# Patient Record
Sex: MALE | Race: WHITE | NOT HISPANIC OR LATINO | Employment: FULL TIME | URBAN - METROPOLITAN AREA
[De-identification: names, ages, dates, MRNs, and addresses within clinical notes are randomized per-mention and may not be internally consistent; named-entity substitution may affect disease eponyms.]

---

## 2023-03-07 ENCOUNTER — OFFICE VISIT (OUTPATIENT)
Dept: PRIMARY CARE | Facility: CLINIC | Age: 28
End: 2023-03-07
Payer: COMMERCIAL

## 2023-03-07 VITALS
DIASTOLIC BLOOD PRESSURE: 79 MMHG | WEIGHT: 223.5 LBS | OXYGEN SATURATION: 96 % | BODY MASS INDEX: 35.08 KG/M2 | HEIGHT: 67 IN | HEART RATE: 73 BPM | SYSTOLIC BLOOD PRESSURE: 126 MMHG

## 2023-03-07 DIAGNOSIS — R00.2 PALPITATIONS: ICD-10-CM

## 2023-03-07 DIAGNOSIS — R94.31 ABNORMAL EKG: ICD-10-CM

## 2023-03-07 DIAGNOSIS — F41.9 ANXIETY: ICD-10-CM

## 2023-03-07 DIAGNOSIS — R06.09 DOE (DYSPNEA ON EXERTION): Primary | ICD-10-CM

## 2023-03-07 PROCEDURE — 99203 OFFICE O/P NEW LOW 30 MIN: CPT | Performed by: FAMILY MEDICINE

## 2023-03-07 RX ORDER — ALBUTEROL SULFATE 90 UG/1
2 AEROSOL, METERED RESPIRATORY (INHALATION) EVERY 4 HOURS PRN
Qty: 8.5 G | Refills: 0 | Status: SHIPPED | OUTPATIENT
Start: 2023-03-07 | End: 2024-03-06

## 2023-03-07 ASSESSMENT — ENCOUNTER SYMPTOMS
DIZZINESS: 1
PALPITATIONS: 1
WHEEZING: 1
COUGH: 1
NAUSEA: 0
BACK PAIN: 1
CONSTIPATION: 0
WEAKNESS: 0
CHEST TIGHTNESS: 0
NUMBNESS: 0
VOMITING: 0
ABDOMINAL PAIN: 0
FEVER: 0
UNEXPECTED WEIGHT CHANGE: 0
SHORTNESS OF BREATH: 1
DIARRHEA: 0
CHILLS: 0
BLOOD IN STOOL: 0

## 2023-03-07 NOTE — PROGRESS NOTES
Subjective   Patient ID: Beau Amin is a 28 y.o. male who presents for Shortness of Breath (Pt presents as new pt. States he has a lot of stress, SOB post covid x 2 and the flu, low back pain comes and goes.BL).  C/o SOB during COVID the first time (2020), fine during COVID the second time (Summer 2020), real bad when he had influenza (Nov, Dec 2022, diagnosed at urgent care in [place], not sure if A or B), and since then. SOB worst going up stairs or up hill. Smokes, wants to quit, only time he ever quit is when he was locked up and when ill.   Also c/o wheezing, mild orthopnea, cough since influenza.  Denies h/o asthma. Sister and mother have asthma. Denies FMHx heart disease.    Denies chest pain, exertional chest pain, denies pain/swelling/bruising/redness in the legs, pain with deep inhalation.    Shortness of Breath  Associated symptoms include wheezing. Pertinent negatives include no abdominal pain, chest pain, fever, leg swelling, rash or vomiting.       Review of Systems   Constitutional:  Negative for chills, fever and unexpected weight change.   HENT:  Negative for congestion.    Respiratory:  Positive for cough, shortness of breath and wheezing. Negative for chest tightness.    Cardiovascular:  Positive for palpitations. Negative for chest pain and leg swelling.   Gastrointestinal:  Negative for abdominal pain, blood in stool, constipation, diarrhea, nausea and vomiting.   Genitourinary: Negative.    Musculoskeletal:  Positive for back pain.   Skin:  Negative for rash.   Neurological:  Positive for dizziness (with anxiety attacks). Negative for weakness and numbness.       Objective   Physical Exam  Constitutional:       General: He is not in acute distress.     Appearance: Normal appearance. He is obese.   HENT:      Head: Normocephalic and atraumatic.   Eyes:      General: No scleral icterus.     Extraocular Movements: Extraocular movements intact.      Conjunctiva/sclera: Conjunctivae normal.    Neck:      Vascular: No carotid bruit.      Comments: Normal thyroid  Cardiovascular:      Rate and Rhythm: Normal rate and regular rhythm.      Pulses: Normal pulses.      Heart sounds: Normal heart sounds. No murmur heard.     No friction rub. No gallop.   Pulmonary:      Effort: Pulmonary effort is normal. No respiratory distress.      Breath sounds: Normal breath sounds. No wheezing, rhonchi or rales.   Abdominal:      General: Abdomen is flat. There is no distension.      Palpations: Abdomen is soft. There is no mass.      Tenderness: There is no abdominal tenderness. There is no guarding or rebound.   Musculoskeletal:         General: Normal range of motion.      Cervical back: Normal range of motion and neck supple. No rigidity or tenderness.   Lymphadenopathy:      Cervical: No cervical adenopathy.   Skin:     General: Skin is warm and dry.   Neurological:      General: No focal deficit present.      Mental Status: He is alert and oriented to person, place, and time. Mental status is at baseline.      Motor: No weakness.   Psychiatric:         Mood and Affect: Mood normal.         Behavior: Behavior normal.         Thought Content: Thought content normal.         Judgment: Judgment normal.      Comments: Moderately anxious when discussing blood draw.         Assessment/Plan   Diagnoses and all orders for this visit:  ROCA (dyspnea on exertion)  Comments:  Cardiac workup. Try albuterol inhaler.  Anxiety  Comments:  May consider SSRI or BuSpar. He declines medications, for now (does not like to take medications).  Palpitations  Comments:  Holter monitor.

## 2023-03-07 NOTE — PATIENT INSTRUCTIONS
Recommend eliminating caffeine and alcohol.    Recommend smoking cessation. You might try cold turkey or nicotine replacement, e.g., nicotine lozenges, gum, or patch.    Please schedule a follow-up appointment after testing, to review results and options, and an additional problem-focused appointment(s) for additional problem(s). Recommend a wellness visit within 1-2 months.    Please return or seek medical attention if symptoms persist, change, worsen, or return. For emergencies, call 9-1-1 or go to the nearest Emergency Room.

## 2023-04-17 ENCOUNTER — OFFICE VISIT (OUTPATIENT)
Dept: PRIMARY CARE | Facility: CLINIC | Age: 28
End: 2023-04-17
Payer: COMMERCIAL

## 2023-04-17 VITALS
SYSTOLIC BLOOD PRESSURE: 136 MMHG | WEIGHT: 229.5 LBS | OXYGEN SATURATION: 97 % | DIASTOLIC BLOOD PRESSURE: 97 MMHG | HEART RATE: 85 BPM | BODY MASS INDEX: 35.94 KG/M2

## 2023-04-17 DIAGNOSIS — R94.31 ABNORMAL EKG: ICD-10-CM

## 2023-04-17 DIAGNOSIS — R06.09 DOE (DYSPNEA ON EXERTION): ICD-10-CM

## 2023-04-17 DIAGNOSIS — F41.9 ANXIETY: Primary | ICD-10-CM

## 2023-04-17 PROBLEM — R07.9 CHEST PAIN: Status: RESOLVED | Noted: 2023-04-17 | Resolved: 2023-04-17

## 2023-04-17 PROCEDURE — 99214 OFFICE O/P EST MOD 30 MIN: CPT | Performed by: FAMILY MEDICINE

## 2023-04-17 RX ORDER — ESCITALOPRAM OXALATE 10 MG/1
10 TABLET ORAL DAILY
Qty: 30 TABLET | Refills: 2 | Status: SHIPPED | OUTPATIENT
Start: 2023-04-17 | End: 2023-07-17

## 2023-04-17 RX ORDER — HYDROXYZINE PAMOATE 25 MG/1
25 CAPSULE ORAL EVERY 6 HOURS PRN
Qty: 40 CAPSULE | Refills: 0 | Status: SHIPPED | OUTPATIENT
Start: 2023-04-17

## 2023-04-17 RX ORDER — AMOXICILLIN 500 MG/1
500 CAPSULE ORAL EVERY 8 HOURS SCHEDULED
COMMUNITY
Start: 2023-04-03

## 2023-04-17 RX ORDER — HYDROXYZINE PAMOATE 25 MG/1
1 CAPSULE ORAL EVERY 6 HOURS
COMMUNITY
Start: 2023-04-12 | End: 2023-04-17 | Stop reason: SDUPTHER

## 2023-04-17 ASSESSMENT — ENCOUNTER SYMPTOMS
DIARRHEA: 0
UNEXPECTED WEIGHT CHANGE: 0
DIFFICULTY URINATING: 0
SHORTNESS OF BREATH: 0
FEVER: 0
CHILLS: 0
COUGH: 0
DYSURIA: 0
CONFUSION: 0
NAUSEA: 0
ABDOMINAL PAIN: 0
WHEEZING: 0
DIZZINESS: 0
NUMBNESS: 0
TROUBLE SWALLOWING: 0
LIGHT-HEADEDNESS: 0
BLOOD IN STOOL: 0
VOMITING: 0
WEAKNESS: 0

## 2023-04-17 NOTE — PATIENT INSTRUCTIONS
As always, you should review all possible adverse effects of any medication, prior to taking it.     Please return for a follow-up appointment in 3 months, earlier if any question or concern.

## 2023-04-17 NOTE — PROGRESS NOTES
"Subjective   Patient ID: Beau Amin is a 28 y.o. male who presents for ER Follow-up (Pt presents in ER follow up, dx anxiety/stress, meds given, discuss meds.BL).  HPI  Ended up going to ER prior to getting tests done. Reports diagnosed with anxiety. C/o lots of stress at work and in life.     C/o \"crazy dreams\" with the Hydroxyzine. Report significant relief with taking Vistaril 4x daily. Tried Zoloft when he was a teen, made him feel \"off,\" withdrawn. Zoloft works well for family member(s). Cut back on alcohol, but on a day he drinks, has ~8, about once a week. Is interested in trying Lexapro.    Review of Systems   Constitutional:  Negative for chills, fever and unexpected weight change.   HENT:  Negative for ear pain and trouble swallowing.    Respiratory:  Negative for cough, shortness of breath and wheezing.    Cardiovascular:  Negative for chest pain.   Gastrointestinal:  Negative for abdominal pain, blood in stool, diarrhea, nausea and vomiting.   Genitourinary:  Negative for difficulty urinating and dysuria.   Skin:  Negative for rash.   Neurological:  Negative for dizziness, syncope, weakness, light-headedness and numbness.   Psychiatric/Behavioral:  Negative for behavioral problems and confusion.          Objective   BP (!) 136/97   Pulse 85   Wt 104 kg (229 lb 8 oz)   SpO2 97%   BMI 35.94 kg/m²     Physical Exam  Vitals and nursing note reviewed.   Constitutional:       General: He is not in acute distress.     Appearance: Normal appearance.   HENT:      Head: Normocephalic and atraumatic.   Eyes:      General: No scleral icterus.     Extraocular Movements: Extraocular movements intact.      Conjunctiva/sclera: Conjunctivae normal.   Pulmonary:      Effort: Pulmonary effort is normal. No respiratory distress.   Skin:     General: Skin is warm and dry.      Coloration: Skin is not jaundiced.   Neurological:      Mental Status: He is alert and oriented to person, place, and time. Mental status is " at baseline.   Psychiatric:         Mood and Affect: Mood normal.         Thought Content: Thought content normal.         Assessment/Plan   Problem List Items Addressed This Visit          Other    Anxiety - Primary     Try Lexapro. May take Vistaril PRN. Return in 2-3 months.         Relevant Medications    escitalopram (Lexapro) 10 mg tablet    hydrOXYzine pamoate (Vistaril) 25 mg capsule     Other Visit Diagnoses       ROCA (dyspnea on exertion)        Appears to be due to anxiety.    Relevant Orders    Stress test    Abnormal EKG        Declines nuclear stress test. Requests treadmill stress test, instead. Cautioned the nuclear test gives more info/other might miss, adamantly declines.    Relevant Orders    Stress test

## 2023-07-14 DIAGNOSIS — F41.9 ANXIETY: ICD-10-CM

## 2023-07-17 RX ORDER — ESCITALOPRAM OXALATE 10 MG/1
TABLET ORAL
Qty: 30 TABLET | Refills: 0 | Status: SHIPPED | OUTPATIENT
Start: 2023-07-17 | End: 2023-08-15

## 2023-08-15 DIAGNOSIS — F41.9 ANXIETY: ICD-10-CM

## 2023-08-15 RX ORDER — ESCITALOPRAM OXALATE 10 MG/1
TABLET ORAL
Qty: 30 TABLET | Refills: 0 | Status: SHIPPED | OUTPATIENT
Start: 2023-08-15 | End: 2023-09-19

## 2023-08-15 RX ORDER — CYCLOBENZAPRINE HCL 10 MG
TABLET ORAL
COMMUNITY
Start: 2017-06-22

## 2023-08-15 RX ORDER — PREDNISONE 10 MG/1
TABLET ORAL
COMMUNITY
Start: 2017-06-22

## 2023-09-16 ENCOUNTER — TELEPHONE (OUTPATIENT)
Dept: PRIMARY CARE | Facility: CLINIC | Age: 28
End: 2023-09-16

## 2023-09-16 DIAGNOSIS — F41.9 ANXIETY: ICD-10-CM

## 2023-09-19 RX ORDER — ESCITALOPRAM OXALATE 10 MG/1
TABLET ORAL
Qty: 30 TABLET | Refills: 0 | Status: SHIPPED | OUTPATIENT
Start: 2023-09-19 | End: 2023-10-30

## 2023-10-22 DIAGNOSIS — F41.9 ANXIETY: ICD-10-CM

## 2023-10-27 NOTE — TELEPHONE ENCOUNTER
Pt stated he has children, and has a heavy work schedule, did not feel he could afford to make an appointment at this time.  It was explained that for certain Rx refills it was necessary for pt's to be seen every 3 months, and this could stall his refill from going to pharmacy.  Pt stated he would just stop the medication, this was highly discouraged before pt ended the call.

## 2023-10-30 RX ORDER — ESCITALOPRAM OXALATE 10 MG/1
TABLET ORAL
Qty: 30 TABLET | Refills: 2 | Status: SHIPPED | OUTPATIENT
Start: 2023-10-30 | End: 2024-03-12

## 2024-03-11 DIAGNOSIS — F41.9 ANXIETY: ICD-10-CM

## 2024-03-12 RX ORDER — ESCITALOPRAM OXALATE 10 MG/1
TABLET ORAL
Qty: 10 TABLET | Refills: 2 | Status: SHIPPED | OUTPATIENT
Start: 2024-03-12

## 2024-10-17 ENCOUNTER — APPOINTMENT (OUTPATIENT)
Dept: RADIOLOGY | Facility: HOSPITAL | Age: 29
End: 2024-10-17

## 2024-10-17 ENCOUNTER — HOSPITAL ENCOUNTER (EMERGENCY)
Facility: HOSPITAL | Age: 29
Discharge: HOME | End: 2024-10-17
Attending: STUDENT IN AN ORGANIZED HEALTH CARE EDUCATION/TRAINING PROGRAM

## 2024-10-17 VITALS
WEIGHT: 230.6 LBS | OXYGEN SATURATION: 98 % | TEMPERATURE: 98.7 F | SYSTOLIC BLOOD PRESSURE: 151 MMHG | HEIGHT: 68 IN | HEART RATE: 87 BPM | BODY MASS INDEX: 34.95 KG/M2 | RESPIRATION RATE: 17 BRPM | DIASTOLIC BLOOD PRESSURE: 85 MMHG

## 2024-10-17 DIAGNOSIS — R19.7 DIARRHEA, UNSPECIFIED TYPE: ICD-10-CM

## 2024-10-17 DIAGNOSIS — R10.84 GENERALIZED ABDOMINAL PAIN: Primary | ICD-10-CM

## 2024-10-17 LAB
ALBUMIN SERPL BCP-MCNC: 4.9 G/DL (ref 3.4–5)
ALP SERPL-CCNC: 60 U/L (ref 33–120)
ALT SERPL W P-5'-P-CCNC: 80 U/L (ref 10–52)
ANION GAP SERPL CALCULATED.3IONS-SCNC: 16 MMOL/L (ref 10–20)
AST SERPL W P-5'-P-CCNC: 39 U/L (ref 9–39)
BASOPHILS # BLD AUTO: 0.02 X10*3/UL (ref 0–0.1)
BASOPHILS NFR BLD AUTO: 0.3 %
BILIRUB SERPL-MCNC: 1.1 MG/DL (ref 0–1.2)
BUN SERPL-MCNC: 12 MG/DL (ref 6–23)
CALCIUM SERPL-MCNC: 9.5 MG/DL (ref 8.6–10.3)
CHLORIDE SERPL-SCNC: 101 MMOL/L (ref 98–107)
CO2 SERPL-SCNC: 27 MMOL/L (ref 21–32)
CREAT SERPL-MCNC: 1.04 MG/DL (ref 0.5–1.3)
EGFRCR SERPLBLD CKD-EPI 2021: >90 ML/MIN/1.73M*2
EOSINOPHIL # BLD AUTO: 0.04 X10*3/UL (ref 0–0.7)
EOSINOPHIL NFR BLD AUTO: 0.6 %
ERYTHROCYTE [DISTWIDTH] IN BLOOD BY AUTOMATED COUNT: 12.1 % (ref 11.5–14.5)
GLUCOSE SERPL-MCNC: 111 MG/DL (ref 74–99)
HCT VFR BLD AUTO: 48 % (ref 41–52)
HEMOCCULT SP1 STL QL: NEGATIVE
HGB BLD-MCNC: 17.3 G/DL (ref 13.5–17.5)
IMM GRANULOCYTES # BLD AUTO: 0.02 X10*3/UL (ref 0–0.7)
IMM GRANULOCYTES NFR BLD AUTO: 0.3 % (ref 0–0.9)
LIPASE SERPL-CCNC: 18 U/L (ref 9–82)
LYMPHOCYTES # BLD AUTO: 1.28 X10*3/UL (ref 1.2–4.8)
LYMPHOCYTES NFR BLD AUTO: 18.1 %
MCH RBC QN AUTO: 33.2 PG (ref 26–34)
MCHC RBC AUTO-ENTMCNC: 36 G/DL (ref 32–36)
MCV RBC AUTO: 92 FL (ref 80–100)
MONOCYTES # BLD AUTO: 0.67 X10*3/UL (ref 0.1–1)
MONOCYTES NFR BLD AUTO: 9.5 %
NEUTROPHILS # BLD AUTO: 5.04 X10*3/UL (ref 1.2–7.7)
NEUTROPHILS NFR BLD AUTO: 71.2 %
NRBC BLD-RTO: 0 /100 WBCS (ref 0–0)
PLATELET # BLD AUTO: 225 X10*3/UL (ref 150–450)
POTASSIUM SERPL-SCNC: 3.7 MMOL/L (ref 3.5–5.3)
PROT SERPL-MCNC: 8 G/DL (ref 6.4–8.2)
RBC # BLD AUTO: 5.21 X10*6/UL (ref 4.5–5.9)
SODIUM SERPL-SCNC: 140 MMOL/L (ref 136–145)
WBC # BLD AUTO: 7.1 X10*3/UL (ref 4.4–11.3)

## 2024-10-17 PROCEDURE — 82270 OCCULT BLOOD FECES: CPT | Performed by: STUDENT IN AN ORGANIZED HEALTH CARE EDUCATION/TRAINING PROGRAM

## 2024-10-17 PROCEDURE — 96374 THER/PROPH/DIAG INJ IV PUSH: CPT | Mod: 59

## 2024-10-17 PROCEDURE — 74177 CT ABD & PELVIS W/CONTRAST: CPT

## 2024-10-17 PROCEDURE — 2550000001 HC RX 255 CONTRASTS: Performed by: STUDENT IN AN ORGANIZED HEALTH CARE EDUCATION/TRAINING PROGRAM

## 2024-10-17 PROCEDURE — 83690 ASSAY OF LIPASE: CPT | Performed by: STUDENT IN AN ORGANIZED HEALTH CARE EDUCATION/TRAINING PROGRAM

## 2024-10-17 PROCEDURE — 87506 IADNA-DNA/RNA PROBE TQ 6-11: CPT | Mod: TRILAB,WESLAB | Performed by: STUDENT IN AN ORGANIZED HEALTH CARE EDUCATION/TRAINING PROGRAM

## 2024-10-17 PROCEDURE — 99284 EMERGENCY DEPT VISIT MOD MDM: CPT | Mod: 25

## 2024-10-17 PROCEDURE — 2500000004 HC RX 250 GENERAL PHARMACY W/ HCPCS (ALT 636 FOR OP/ED): Performed by: STUDENT IN AN ORGANIZED HEALTH CARE EDUCATION/TRAINING PROGRAM

## 2024-10-17 PROCEDURE — 74177 CT ABD & PELVIS W/CONTRAST: CPT | Performed by: RADIOLOGY

## 2024-10-17 PROCEDURE — 85025 COMPLETE CBC W/AUTO DIFF WBC: CPT | Performed by: STUDENT IN AN ORGANIZED HEALTH CARE EDUCATION/TRAINING PROGRAM

## 2024-10-17 PROCEDURE — 36415 COLL VENOUS BLD VENIPUNCTURE: CPT | Performed by: STUDENT IN AN ORGANIZED HEALTH CARE EDUCATION/TRAINING PROGRAM

## 2024-10-17 PROCEDURE — 80053 COMPREHEN METABOLIC PANEL: CPT | Performed by: STUDENT IN AN ORGANIZED HEALTH CARE EDUCATION/TRAINING PROGRAM

## 2024-10-17 RX ORDER — DICYCLOMINE HYDROCHLORIDE 20 MG/1
20 TABLET ORAL 2 TIMES DAILY
Qty: 20 TABLET | Refills: 0 | Status: SHIPPED | OUTPATIENT
Start: 2024-10-17 | End: 2024-10-27

## 2024-10-17 RX ORDER — ONDANSETRON HYDROCHLORIDE 2 MG/ML
4 INJECTION, SOLUTION INTRAVENOUS ONCE
Status: COMPLETED | OUTPATIENT
Start: 2024-10-17 | End: 2024-10-17

## 2024-10-17 RX ADMIN — ONDANSETRON 4 MG: 2 INJECTION INTRAMUSCULAR; INTRAVENOUS at 10:51

## 2024-10-17 RX ADMIN — IOHEXOL 75 ML: 350 INJECTION, SOLUTION INTRAVENOUS at 12:10

## 2024-10-17 ASSESSMENT — PAIN DESCRIPTION - PROGRESSION: CLINICAL_PROGRESSION: NOT CHANGED

## 2024-10-17 ASSESSMENT — PAIN DESCRIPTION - ORIENTATION: ORIENTATION: LOWER;MID;UPPER

## 2024-10-17 ASSESSMENT — COLUMBIA-SUICIDE SEVERITY RATING SCALE - C-SSRS
6. HAVE YOU EVER DONE ANYTHING, STARTED TO DO ANYTHING, OR PREPARED TO DO ANYTHING TO END YOUR LIFE?: NO
2. HAVE YOU ACTUALLY HAD ANY THOUGHTS OF KILLING YOURSELF?: NO
1. IN THE PAST MONTH, HAVE YOU WISHED YOU WERE DEAD OR WISHED YOU COULD GO TO SLEEP AND NOT WAKE UP?: NO

## 2024-10-17 ASSESSMENT — PAIN DESCRIPTION - LOCATION: LOCATION: ABDOMEN

## 2024-10-17 ASSESSMENT — PAIN SCALES - GENERAL: PAINLEVEL_OUTOF10: 7

## 2024-10-17 ASSESSMENT — PAIN DESCRIPTION - PAIN TYPE: TYPE: ACUTE PAIN

## 2024-10-17 ASSESSMENT — PAIN DESCRIPTION - FREQUENCY: FREQUENCY: CONSTANT/CONTINUOUS

## 2024-10-17 ASSESSMENT — PAIN DESCRIPTION - ONSET: ONSET: SUDDEN

## 2024-10-17 ASSESSMENT — PAIN DESCRIPTION - DESCRIPTORS
DESCRIPTORS: SHARP
DESCRIPTORS: SHARP

## 2024-10-17 ASSESSMENT — PAIN - FUNCTIONAL ASSESSMENT: PAIN_FUNCTIONAL_ASSESSMENT: 0-10

## 2024-10-17 NOTE — ED PROVIDER NOTES
History of Present Illness     History provided by: Patient  Limitations to History: None  External Records Reviewed with Brief Summary: None    HPI:  Beau Amin is a 29 y.o. male presents with abdominal pain.  Patient notes diffuse abdominal pain with associated diarrhea.  Patient states he has episodes of diarrhea on and off but never with abdominal pain.  Notes the pain is mostly to the center of his abdomen radiating down.  No recent fevers or chills.  Nausea without vomiting.    Physical Exam   Triage vitals:  T 37.1 °C (98.7 °F)  HR 95  BP (!) 125/47  RR 18  O2 96 % None (Room air)    Physical Exam  Vitals and nursing note reviewed.   Constitutional:       General: He is not in acute distress.     Appearance: He is not ill-appearing.   HENT:      Head: Normocephalic and atraumatic.      Mouth/Throat:      Mouth: Mucous membranes are moist.      Pharynx: Oropharynx is clear.   Eyes:      Extraocular Movements: Extraocular movements intact.      Conjunctiva/sclera: Conjunctivae normal.      Pupils: Pupils are equal, round, and reactive to light.   Cardiovascular:      Rate and Rhythm: Normal rate and regular rhythm.   Pulmonary:      Effort: Pulmonary effort is normal. No respiratory distress.      Breath sounds: Normal breath sounds.   Abdominal:      General: There is no distension.      Palpations: Abdomen is soft.      Tenderness: There is abdominal tenderness in the periumbilical area and suprapubic area. There is no right CVA tenderness, left CVA tenderness, guarding or rebound.   Musculoskeletal:         General: No swelling or deformity. Normal range of motion.      Cervical back: Normal range of motion and neck supple.   Skin:     General: Skin is warm and dry.      Capillary Refill: Capillary refill takes less than 2 seconds.   Neurological:      General: No focal deficit present.      Mental Status: He is alert and oriented to person, place, and time. Mental status is at baseline.    Psychiatric:         Mood and Affect: Mood normal.         Behavior: Behavior normal.          Medical Decision Making & ED Course   Medical Decision Makin y.o. male presents with generalized abdominal pain.  Considered cholecystitis, choledocholithiasis, pancreatitis, PUD, perforated bowel, mesenteric ischemia, colitis, IBD, small bowel obstruction, appendicitis, diverticulitis, volvulus, small bowel obstruction, nephrolithiasis, UTI.  Abdomen soft, nondistended.  No leukocytosis.  Normal renal function, normal electrolytes.  Stool sample provided, occult blood negative, stool pathogens pending however have low suspicion of infectious diarrhea as there is no recent travel or recent exposures to undercooked foods.  CT abdomen obtained, concerning for possible undiagnosed Crohn's disease.  Spoke with GI on-call.  Patient has stable vital signs and has been able to tolerate oral intake without difficulty.  GI recommending close follow-up outpatient as he will require proper diagnostics with colonoscopy and biopsies and may likely need colorectal surgery consultation as well.  Referrals placed for both colorectal surgery and GI.  Patient provided with contact information for both specialties.  Patient is uninsured at this time and may have difficulty with outpatient follow-up.  Have provided patient with options regarding different medical systems that often assist with uninsured patients as well as make sure that registration provided him with billing assistance documentation from .  Patient responded well to treatments in the emergency department and feels comfortable going home to follow up with primary care doctor.     ----  Social Determinants of Health which Significantly Impact Care:  Patient uninsured      EKG Independent Interpretation: EKG not obtained    Independent Result Review and Interpretation: Relevant laboratory and radiographic results were reviewed and independently interpreted by myself.   As necessary, they are commented on in the ED Course.    Chronic conditions affecting the patient's care: As documented above in MDM    The patient was discussed with the following consultants/services:  GI on-call    Care Considerations: As documented above in MDM    ED Course:  ED Course as of 10/17/24 1754   Thu Oct 17, 2024   1313 Spoke with GI - as long as he can tolerate oral intake he can been seen outpatient.  Without knowing if this is truly Chron's, unable to start medications at this time but can give Bentyl for pain control.  [JM]      ED Course User Index  [JM] Arianne Vargas MD         Diagnoses as of 10/17/24 1754   Generalized abdominal pain   Diarrhea, unspecified type       Procedures   Procedures    Arianne Vargas MD  Emergency Medicine     Arianne Vargas MD  10/17/24 7201

## 2024-10-17 NOTE — PROGRESS NOTES
Pharmacy Medication History Review    Beau Amin is a 29 y.o. male admitted for Abdominal Pain.  Pharmacy reviewed the patient's jizgo-sm-mewkzswjt medications and allergies for accuracy.    The list below reflectives the updated PTA list. Please review each medication in order reconciliation for additional clarification and justification.  Prior to Admission Medications   Prescriptions Last Dose Informant Patient Reported? Taking?   albuterol (ProAir HFA) 90 mcg/actuation inhaler   No No   Sig: Inhale 2 puffs every 4 hours if needed for wheezing or shortness of breath.   escitalopram (Lexapro) 10 mg tablet   No No   Sig: TAKE 1 TABLET BY MOUTH EVERY DAY   hydrOXYzine pamoate (Vistaril) 25 mg capsule   No No   Sig: Take 1 capsule (25 mg) by mouth every 6 hours if needed for anxiety.      Facility-Administered Medications: None          The list below reflectives the updated allergy list. Please review each documented allergy for additional clarification and justification.  Allergies  Reviewed by Dorothea Shah CPhT on 10/17/2024   No Known Allergies         Below are additional concerns with the patient's PTA list.  - flagged for review to be deleted:  Lexapro  Vistaril  Albuterol   - pt is not currently taking any OTC or prescription medications    DOROTHEA SHAH CPhT

## 2024-10-17 NOTE — DISCHARGE INSTRUCTIONS
You were seen in the emergency department today for abdominal pain and diarrhea.  At this time I am concerned that you may have Crohn's disease.  I have included your reports to your discharge summary.  I have placed a referral for both GI as well as colorectal surgery.  If you have any other concerns, please return to the emergency department for reevaluation.

## 2024-10-18 LAB

## 2025-05-27 ENCOUNTER — HOSPITAL ENCOUNTER (EMERGENCY)
Facility: HOSPITAL | Age: 30
Discharge: LEFT BEFORE DISCHARGE | End: 2025-05-27

## 2025-05-27 VITALS
OXYGEN SATURATION: 97 % | DIASTOLIC BLOOD PRESSURE: 95 MMHG | TEMPERATURE: 98.8 F | HEIGHT: 68 IN | SYSTOLIC BLOOD PRESSURE: 146 MMHG | WEIGHT: 223.11 LBS | HEART RATE: 101 BPM | RESPIRATION RATE: 18 BRPM | BODY MASS INDEX: 33.81 KG/M2

## 2025-05-27 DIAGNOSIS — R10.84 GENERALIZED ABDOMINAL PAIN: ICD-10-CM

## 2025-05-27 DIAGNOSIS — I10 HYPERTENSION, UNSPECIFIED TYPE: ICD-10-CM

## 2025-05-27 DIAGNOSIS — R00.0 TACHYCARDIA: ICD-10-CM

## 2025-05-27 DIAGNOSIS — Z53.29 LEFT AGAINST MEDICAL ADVICE: ICD-10-CM

## 2025-05-27 DIAGNOSIS — R11.2 NAUSEA AND VOMITING, UNSPECIFIED VOMITING TYPE: Primary | ICD-10-CM

## 2025-05-27 PROCEDURE — 99283 EMERGENCY DEPT VISIT LOW MDM: CPT

## 2025-05-27 PROCEDURE — 99282 EMERGENCY DEPT VISIT SF MDM: CPT

## 2025-05-27 RX ORDER — ONDANSETRON 4 MG/1
4 TABLET, FILM COATED ORAL EVERY 6 HOURS
Qty: 12 TABLET | Refills: 0 | Status: SHIPPED | OUTPATIENT
Start: 2025-05-27 | End: 2025-05-30

## 2025-05-27 RX ORDER — KETOROLAC TROMETHAMINE 15 MG/ML
15 INJECTION, SOLUTION INTRAMUSCULAR; INTRAVENOUS ONCE
Status: DISCONTINUED | OUTPATIENT
Start: 2025-05-27 | End: 2025-05-27 | Stop reason: HOSPADM

## 2025-05-27 RX ORDER — FAMOTIDINE 20 MG/1
20 TABLET, FILM COATED ORAL DAILY
Qty: 15 TABLET | Refills: 0 | Status: SHIPPED | OUTPATIENT
Start: 2025-05-27 | End: 2025-06-11

## 2025-05-27 RX ORDER — ONDANSETRON HYDROCHLORIDE 2 MG/ML
4 INJECTION, SOLUTION INTRAVENOUS ONCE
Status: DISCONTINUED | OUTPATIENT
Start: 2025-05-27 | End: 2025-05-27 | Stop reason: HOSPADM

## 2025-05-27 RX ORDER — FAMOTIDINE 10 MG/ML
20 INJECTION, SOLUTION INTRAVENOUS ONCE
Status: DISCONTINUED | OUTPATIENT
Start: 2025-05-27 | End: 2025-05-27 | Stop reason: HOSPADM

## 2025-05-27 ASSESSMENT — PAIN - FUNCTIONAL ASSESSMENT: PAIN_FUNCTIONAL_ASSESSMENT: 0-10

## 2025-05-27 ASSESSMENT — PAIN DESCRIPTION - PAIN TYPE: TYPE: ACUTE PAIN

## 2025-05-27 ASSESSMENT — PAIN SCALES - GENERAL: PAINLEVEL_OUTOF10: 2

## 2025-05-27 ASSESSMENT — PAIN DESCRIPTION - LOCATION: LOCATION: GENERALIZED

## 2025-05-27 ASSESSMENT — PAIN DESCRIPTION - ONSET: ONSET: ONGOING

## 2025-05-27 ASSESSMENT — PAIN DESCRIPTION - PROGRESSION: CLINICAL_PROGRESSION: NOT CHANGED

## 2025-05-27 ASSESSMENT — PAIN DESCRIPTION - FREQUENCY: FREQUENCY: CONSTANT/CONTINUOUS

## 2025-05-27 ASSESSMENT — PAIN DESCRIPTION - DESCRIPTORS: DESCRIPTORS: DISCOMFORT;SORE

## 2025-05-27 NOTE — Clinical Note
Beau Amin was seen and treated in our emergency department on 5/27/2025.  He may return to work on 05/28/2025.       If you have any questions or concerns, please don't hesitate to call.      Harrison Geronimo PA-C

## 2025-05-28 NOTE — DISCHARGE INSTRUCTIONS
You have chosen to leave the emergency department AGAINST MEDICAL ADVICE.  You elected to not have any tests or diagnostics and you had only a set of vital signs, your vital signs were abnormal.  Your blood pressure was elevated, your heart rate was elevated, you indicated that you have been having a lot of vomiting with abdominal discomfort.  I strongly recommended that you have laboratory studies IV fluids and additional evaluation but he refused.  He requested only a work note.    If you change your mind, develop different worsening pains or symptoms, or would like an evaluation we are always here for you and would be happy to take care of you.  It has been made very clear that my recommendation was to have additional testing evaluation and even though you chose to refuse this this time if you change your mind you are always welcome to come back

## 2025-05-28 NOTE — ED PROVIDER NOTES
HPI   Chief Complaint   Patient presents with    Vomiting     Pt reports his whole family became sick with vomiting. Pt states this has been ongoing for past 2 days, feels worse today. No diarrhea. pt needs worknote        HPI    30-year-old male coming into the emergency department for vomiting.  He has had 2 days of nausea and vomiting.  Apparently his whole household felt ill with similar symptoms, starting with his children and working through the rest of the household, his female  recently had the same symptoms, he says that they are all improving, and he is now the last to get sick.  He says he is here because he cannot stop vomiting and his work requested a work note he denies any specific abdominal pains.  And says that he just has trouble keeping things down    Patient History   Medical History[1]  Surgical History[2]  Family History[3]  Social History[4]    Physical Exam   ED Triage Vitals   Temp Pulse Resp BP   -- -- -- --      SpO2 Temp src Heart Rate Source Patient Position   -- -- -- --      BP Location FiO2 (%)     -- --       Physical Exam  PHYSICAL EXAMINATION    GENERAL APPEARANCE: Awake and alert.     VITAL SIGNS: As per the nurses' triage record.     HEENT: Normocephalic, atraumatic. Extraocular muscles are intact. Pupils equal round and reactive to light. Conjunctiva are pink. Negative scleral icterus. Mucous membranes are moist. Tongue in the midline. Pharynx was without erythema or exudates, uvula midline    NECK: Soft Nontender and supple, full gross ROM, no meningeal signs.    CHEST: Nontender to palpation. Clear to auscultation bilaterally. No rales, rhonchi, or wheezing.     HEART: S1, S2. Regular rate and rhythm. No murmurs, gallops or rubs.  Strong and equal pulses in the extremities.     ABDOMEN: Soft, nontender, nondistended, positive bowel sounds, no palpable masses.    MUSCULOSKELETAL: The calves are nontender to palpation. Full gross active range of motion. Ambulating on  own with no acute difficulties     NEUROLOGICAL: Awake, alert and oriented x 3. Power intact in the upper and lower extremities. Sensation is intact to light touch in the upper and lower extremities.     IMMUNOLOGICAL: No lymphatic streaking noted     DERM: No petechiae, rashes, or ecchymoses.    ED Course & MDM   ED Course as of 05/27/25 2131   Tue May 27, 2025   2125 ED staff just came in and requested that I see the patient again, upon doing so the patient stated that he does not want lab work, does not want an IV, does not want hydration, does not want any swabs, says that he wants only a work note.  Says that he will go home and hydrate at home.  Under the witness of the ED staff I informed him that I am not able to appropriately evaluate him for nausea and vomiting and abdominal discomfort without obtaining laboratory studies.  And I am worried about his hydration status and would like to IV hydrate him.  However he is refusing this, that may result in loss of life loss of lifestyle, worsening of condition and misdiagnosis.  He is verbalized understanding of all of this, witnessed by female  present as well as ED staff [AP]      ED Course User Index  [AP] Harrison Geronimo PA-C         Diagnoses as of 05/27/25 2131   Nausea and vomiting, unspecified vomiting type   Generalized abdominal pain   Left against medical advice   Hypertension, unspecified type   Tachycardia                 No data recorded     [place] Coma Scale Score: 15 (05/27/25 2120 : Asiya Cunningham, EMT)                           Medical Decision Making  Parts of this chart have been completed using voice recognition software. Please excuse any errors of transcription.  My thought process and reason for plan has been formulated from the time that I saw the patient until the time of disposition and is not specific to one specific moment during their visit and furthermore my MDM encompasses this entire chart and not only this text  box.      HPI: Detailed above.    Exam: A medically appropriate exam performed, outlined above, given the known history and presentation.    History Limited by: Nothing    History obtained from: Patient    External/internal records reviewed: No external record reviewed    Social Determinants of Health considered during this visit: Lives at home    Chronic conditions impacting care: Denies    Medications given during visit:  Medications   sodium chloride 0.9 % bolus 1,000 mL (has no administration in time range)   ketorolac (Toradol) injection 15 mg (has no administration in time range)   ondansetron (Zofran) injection 4 mg (has no administration in time range)   famotidine PF (Pepcid) injection 20 mg (has no administration in time range)        Diagnostic/tests  Labs Reviewed   CBC WITH AUTO DIFFERENTIAL   COMPREHENSIVE METABOLIC PANEL   LIPASE   MAGNESIUM   URINALYSIS WITH REFLEX CULTURE AND MICROSCOPIC    Narrative:     The following orders were created for panel order Urinalysis with Reflex Culture and Microscopic.  Procedure                               Abnormality         Status                     ---------                               -----------         ------                     Urinalysis with Reflex C...[[id number]]                                                 Extra Urine Gray Tube[[id number]]                                                         Please view results for these tests on the individual orders.   URINALYSIS WITH REFLEX CULTURE AND MICROSCOPIC   EXTRA URINE GRAY TUBE   SARS-COV-2 AND INFLUENZA A/B PCR      No orders to display            Case discussed with: ED attending physician    Prescription medications considered: I provided him Zofran and Pepcid for symptomatic management despite the fact he chose to leave AGAINST MEDICAL ADVICE    Considerations/further MDM:  We discussed the nature and purpose, risks and benefits, as well as, the alternatives of the given diagnosis and concerns.  Time was given to allow the opportunity to ask questions and consider their options, and after the discussion, the patient decided to refuse the offered treatment plan. The patient was informed that refusal could lead to, but was not limited to, death, permanent disability, or severe pain, loss of current lifestyles and abilities. If present, I asked the relatives or significant others to dissuade them without success. Prior to refusing, their nurse and I determined and agreed that the patient had the capacity to make their decision and understood the consequences of that decision. They signed the refusal of treatment form and their nurse signed the form agreeing that the patient/guardian had received informed consent. After refusal, I made every reasonable opportunity to treat them to the best of my ability while still trying to accommodate the patient's wishes and desires.    My discussion with the patient was witnessed by ED staff, the patient has a very clear understanding that I am not able to discharge him at this current state, he has agreed to sign out AGAINST MEDICAL ADVICE, the patient will not allow any test diagnostics or labs to be done.  And yet came in with a primary complaint of abdominal pain with nausea and vomiting.  The patient will be treated symptomatically and I have educated him that he can come back at any time.  He understands that this may result in loss of life loss of lifestyle worsening of condition worsening of symptoms and misdiagnosis.      Procedure  Procedures       [1]   Past Medical History:  Diagnosis Date    Chest pain 04/17/2023    COVID-19     Influenza    [2] No past surgical history on file.  [3]   Family History  Problem Relation Name Age of Onset    Asthma Mother      Asthma Sister      Cancer Maternal Grandmother     [4]   Social History  Tobacco Use    Smoking status: Every Day     Current packs/day: 0.50     Average packs/day: 0.5 packs/day for 15.0 years (7.5 ttl  "pk-yrs)     Types: Cigarettes    Smokeless tobacco: Never   Vaping Use    Vaping status: Not on file   Substance Use Topics    Alcohol use: Yes     Alcohol/week: 6.0 standard drinks of alcohol     Types: 6 Standard drinks or equivalent per week     Comment: per week    Drug use: Not Currently     Types: \"Crack\" cocaine, LSD, Psilocybin, Oxycodone, MDMA (ecstacy)        Harrison Geronimo PA-C  05/27/25 7108    "